# Patient Record
Sex: FEMALE | Race: BLACK OR AFRICAN AMERICAN | NOT HISPANIC OR LATINO | Employment: FULL TIME | URBAN - METROPOLITAN AREA
[De-identification: names, ages, dates, MRNs, and addresses within clinical notes are randomized per-mention and may not be internally consistent; named-entity substitution may affect disease eponyms.]

---

## 2021-02-18 ENCOUNTER — OFFICE VISIT (OUTPATIENT)
Dept: URGENT CARE | Facility: CLINIC | Age: 46
End: 2021-02-18
Payer: MEDICAID

## 2021-02-18 VITALS
OXYGEN SATURATION: 96 % | RESPIRATION RATE: 16 BRPM | HEIGHT: 60 IN | SYSTOLIC BLOOD PRESSURE: 134 MMHG | WEIGHT: 180 LBS | BODY MASS INDEX: 35.34 KG/M2 | TEMPERATURE: 100 F | DIASTOLIC BLOOD PRESSURE: 85 MMHG | HEART RATE: 128 BPM

## 2021-02-18 DIAGNOSIS — M79.601 PAIN IN RIGHT ARM: Primary | ICD-10-CM

## 2021-02-18 PROCEDURE — 73110 X-RAY EXAM OF WRIST: CPT | Mod: FY,RT,S$GLB, | Performed by: RADIOLOGY

## 2021-02-18 PROCEDURE — 73080 XR ELBOW COMPLETE 3 VIEW RIGHT: ICD-10-PCS | Mod: FY,RT,S$GLB, | Performed by: RADIOLOGY

## 2021-02-18 PROCEDURE — 99213 PR OFFICE/OUTPT VISIT, EST, LEVL III, 20-29 MIN: ICD-10-PCS | Mod: S$GLB,,, | Performed by: FAMILY MEDICINE

## 2021-02-18 PROCEDURE — 99213 OFFICE O/P EST LOW 20 MIN: CPT | Mod: S$GLB,,, | Performed by: FAMILY MEDICINE

## 2021-02-18 PROCEDURE — 73080 X-RAY EXAM OF ELBOW: CPT | Mod: FY,RT,S$GLB, | Performed by: RADIOLOGY

## 2021-02-18 PROCEDURE — 73110 XR WRIST COMPLETE 3 VIEWS RIGHT: ICD-10-PCS | Mod: FY,RT,S$GLB, | Performed by: RADIOLOGY

## 2021-02-18 RX ORDER — KETOROLAC TROMETHAMINE 10 MG/1
TABLET, FILM COATED ORAL
Qty: 20 TABLET | Refills: 0 | Status: SHIPPED | OUTPATIENT
Start: 2021-02-18

## 2021-07-29 ENCOUNTER — LAB VISIT (OUTPATIENT)
Dept: PRIMARY CARE CLINIC | Facility: OTHER | Age: 46
End: 2021-07-29
Attending: INTERNAL MEDICINE
Payer: MEDICAID

## 2021-07-29 DIAGNOSIS — Z20.822 ENCOUNTER FOR LABORATORY TESTING FOR COVID-19 VIRUS: ICD-10-CM

## 2021-07-29 PROCEDURE — U0003 INFECTIOUS AGENT DETECTION BY NUCLEIC ACID (DNA OR RNA); SEVERE ACUTE RESPIRATORY SYNDROME CORONAVIRUS 2 (SARS-COV-2) (CORONAVIRUS DISEASE [COVID-19]), AMPLIFIED PROBE TECHNIQUE, MAKING USE OF HIGH THROUGHPUT TECHNOLOGIES AS DESCRIBED BY CMS-2020-01-R: HCPCS | Performed by: INTERNAL MEDICINE

## 2021-07-31 LAB
SARS-COV-2 RNA RESP QL NAA+PROBE: NOT DETECTED
SARS-COV-2- CYCLE NUMBER: -1

## 2024-10-22 ENCOUNTER — OCCUPATIONAL HEALTH (OUTPATIENT)
Dept: URGENT CARE | Facility: CLINIC | Age: 49
End: 2024-10-22

## 2024-10-22 DIAGNOSIS — Z13.9 ENCOUNTER FOR SCREENING: Primary | ICD-10-CM

## 2024-10-24 LAB
TB INDURATION 48 - 72 HR READ: 0 MM
TB SKIN TEST 48 - 72 HR READ: NEGATIVE

## 2024-12-13 ENCOUNTER — HOSPITAL ENCOUNTER (EMERGENCY)
Facility: HOSPITAL | Age: 49
Discharge: HOME | End: 2024-12-13
Attending: EMERGENCY MEDICINE | Admitting: INTERNAL MEDICINE
Payer: COMMERCIAL

## 2024-12-13 VITALS
BODY MASS INDEX: 37.3 KG/M2 | RESPIRATION RATE: 16 BRPM | OXYGEN SATURATION: 100 % | WEIGHT: 190 LBS | DIASTOLIC BLOOD PRESSURE: 65 MMHG | HEIGHT: 60 IN | TEMPERATURE: 98 F | HEART RATE: 81 BPM | SYSTOLIC BLOOD PRESSURE: 148 MMHG

## 2024-12-13 DIAGNOSIS — D64.9 ANEMIA: ICD-10-CM

## 2024-12-13 PROBLEM — R03.0 ELEVATED BP WITHOUT DIAGNOSIS OF HYPERTENSION: Status: ACTIVE | Noted: 2024-12-13

## 2024-12-13 LAB
ABO + RH BLD: NORMAL
ANION GAP SERPL CALC-SCNC: 11 MMOL/L (ref 8–16)
ANISOCYTOSIS BLD QL SMEAR: SLIGHT
B-HCG UR QL: NEGATIVE
BASOPHILS # BLD AUTO: 0.03 K/UL (ref 0–0.2)
BASOPHILS NFR BLD: 0.5 % (ref 0–1.9)
BILIRUB DIRECT SERPL-MCNC: 0.1 MG/DL (ref 0.1–0.3)
BILIRUB SERPL-MCNC: 0.3 MG/DL (ref 0.1–1)
BLD GP AB SCN CELLS X3 SERPL QL: NORMAL
BLD PROD TYP BPU: NORMAL
BLOOD UNIT EXPIRATION DATE: NORMAL
BLOOD UNIT TYPE CODE: 7300
BLOOD UNIT TYPE: NORMAL
BUN SERPL-MCNC: 12 MG/DL (ref 6–20)
CALCIUM SERPL-MCNC: 10.6 MG/DL (ref 8.7–10.5)
CHLORIDE SERPL-SCNC: 105 MMOL/L (ref 95–110)
CO2 SERPL-SCNC: 22 MMOL/L (ref 23–29)
CODING SYSTEM: NORMAL
CREAT SERPL-MCNC: 1 MG/DL (ref 0.5–1.4)
CROSSMATCH INTERPRETATION: NORMAL
CTP QC/QA: YES
DIFFERENTIAL METHOD BLD: ABNORMAL
DISPENSE STATUS: NORMAL
EOSINOPHIL # BLD AUTO: 0.1 K/UL (ref 0–0.5)
EOSINOPHIL NFR BLD: 1.7 % (ref 0–8)
ERYTHROCYTE [DISTWIDTH] IN BLOOD BY AUTOMATED COUNT: 23.2 % (ref 11.5–14.5)
EST. GFR  (NO RACE VARIABLE): >60 ML/MIN/1.73 M^2
FERRITIN SERPL-MCNC: 5 NG/ML (ref 20–300)
GLUCOSE SERPL-MCNC: 94 MG/DL (ref 70–110)
HCT VFR BLD AUTO: 25.7 % (ref 37–48.5)
HGB BLD-MCNC: 6.3 G/DL (ref 12–16)
HYPOCHROMIA BLD QL SMEAR: ABNORMAL
IMM GRANULOCYTES # BLD AUTO: 0.02 K/UL (ref 0–0.04)
IMM GRANULOCYTES NFR BLD AUTO: 0.3 % (ref 0–0.5)
LYMPHOCYTES # BLD AUTO: 1.9 K/UL (ref 1–4.8)
LYMPHOCYTES NFR BLD: 32.5 % (ref 18–48)
MCH RBC QN AUTO: 14.8 PG (ref 27–31)
MCHC RBC AUTO-ENTMCNC: 24.5 G/DL (ref 32–36)
MCV RBC AUTO: 61 FL (ref 82–98)
MONOCYTES # BLD AUTO: 0.4 K/UL (ref 0.3–1)
MONOCYTES NFR BLD: 6.7 % (ref 4–15)
NEUTROPHILS # BLD AUTO: 3.4 K/UL (ref 1.8–7.7)
NEUTROPHILS NFR BLD: 58.3 % (ref 38–73)
NRBC BLD-RTO: 0 /100 WBC
NUM UNITS TRANS PACKED RBC: NORMAL
OVALOCYTES BLD QL SMEAR: ABNORMAL
PLATELET # BLD AUTO: 241 K/UL (ref 150–450)
PLATELET BLD QL SMEAR: ABNORMAL
PMV BLD AUTO: ABNORMAL FL (ref 9.2–12.9)
POIKILOCYTOSIS BLD QL SMEAR: SLIGHT
POLYCHROMASIA BLD QL SMEAR: ABNORMAL
POTASSIUM SERPL-SCNC: 4.5 MMOL/L (ref 3.5–5.1)
RBC # BLD AUTO: 4.25 M/UL (ref 4–5.4)
RETICS/RBC NFR AUTO: 1.9 % (ref 0.5–2.5)
SODIUM SERPL-SCNC: 138 MMOL/L (ref 136–145)
SPECIMEN OUTDATE: NORMAL
WBC # BLD AUTO: 5.79 K/UL (ref 3.9–12.7)

## 2024-12-13 PROCEDURE — 86901 BLOOD TYPING SEROLOGIC RH(D): CPT | Performed by: EMERGENCY MEDICINE

## 2024-12-13 PROCEDURE — 85025 COMPLETE CBC W/AUTO DIFF WBC: CPT | Performed by: EMERGENCY MEDICINE

## 2024-12-13 PROCEDURE — 82746 ASSAY OF FOLIC ACID SERUM: CPT

## 2024-12-13 PROCEDURE — 83020 HEMOGLOBIN ELECTROPHORESIS: CPT

## 2024-12-13 PROCEDURE — 36430 TRANSFUSION BLD/BLD COMPNT: CPT

## 2024-12-13 PROCEDURE — 99285 EMERGENCY DEPT VISIT HI MDM: CPT | Mod: 25

## 2024-12-13 PROCEDURE — 85045 AUTOMATED RETICULOCYTE COUNT: CPT

## 2024-12-13 PROCEDURE — P9016 RBC LEUKOCYTES REDUCED: HCPCS | Performed by: EMERGENCY MEDICINE

## 2024-12-13 PROCEDURE — 82247 BILIRUBIN TOTAL: CPT

## 2024-12-13 PROCEDURE — 83010 ASSAY OF HAPTOGLOBIN QUANT: CPT

## 2024-12-13 PROCEDURE — 82248 BILIRUBIN DIRECT: CPT

## 2024-12-13 PROCEDURE — 84466 ASSAY OF TRANSFERRIN: CPT

## 2024-12-13 PROCEDURE — 82607 VITAMIN B-12: CPT

## 2024-12-13 PROCEDURE — 80048 BASIC METABOLIC PNL TOTAL CA: CPT | Performed by: EMERGENCY MEDICINE

## 2024-12-13 PROCEDURE — 82728 ASSAY OF FERRITIN: CPT

## 2024-12-13 PROCEDURE — 81025 URINE PREGNANCY TEST: CPT

## 2024-12-13 PROCEDURE — 86920 COMPATIBILITY TEST SPIN: CPT | Performed by: EMERGENCY MEDICINE

## 2024-12-13 RX ORDER — PHENTERMINE HYDROCHLORIDE 37.5 MG/1
18.75 TABLET ORAL 2 TIMES DAILY
COMMUNITY
Start: 2024-06-20

## 2024-12-13 RX ORDER — FERROUS SULFATE 325(65) MG
325 TABLET ORAL
Qty: 30 TABLET | Refills: 0 | Status: SHIPPED | OUTPATIENT
Start: 2024-12-13 | End: 2024-12-13 | Stop reason: HOSPADM

## 2024-12-13 RX ORDER — HYDROCODONE BITARTRATE AND ACETAMINOPHEN 500; 5 MG/1; MG/1
TABLET ORAL
Status: DISCONTINUED | OUTPATIENT
Start: 2024-12-13 | End: 2024-12-14 | Stop reason: HOSPADM

## 2024-12-13 RX ORDER — FERROUS SULFATE 325(65) MG
325 TABLET ORAL
Qty: 30 TABLET | Refills: 0 | Status: SHIPPED | OUTPATIENT
Start: 2024-12-13 | End: 2025-01-12

## 2024-12-13 NOTE — ED NOTES
Pt referred to ED via PCP for low H&H during routine blood work. Reports labs done Tuesday and yesterday confirming hemoglobin <7. Pt denies any syncope, SOB, weakness, heavy menstrual periods or blood in stool/urine. Pt AAOx4. Mild tachycardia assessed.  All other VSS and pt in no visible distress. MD at bedside.

## 2024-12-14 LAB
FOLATE SERPL-MCNC: 18.1 NG/ML (ref 4–24)
HAPTOGLOB SERPL-MCNC: 69 MG/DL (ref 30–250)
IRON SERPL-MCNC: 42 UG/DL (ref 30–160)
SATURATED IRON: 7 % (ref 20–50)
TOTAL IRON BINDING CAPACITY: 580 UG/DL (ref 250–450)
TRANSFERRIN SERPL-MCNC: 392 MG/DL (ref 200–375)
VIT B12 SERPL-MCNC: 541 PG/ML (ref 210–950)

## 2024-12-14 NOTE — ED NOTES
Pt informed nurse that she does not want to stay. She wants to get the blood transfusion and go home. Nurse called admit team and informed them. Admit providers states that they would come and talk with the pt.

## 2024-12-14 NOTE — DISCHARGE INSTRUCTIONS
Please start taking ferrous sulfate daily. Prescription sent to your pharmacy.     Heme/onc and gastroenterology teams will call you to make an appointment.     We (Internal Medicine team you met) will call with the results of the labs and fax them to your primary care doctor.     Please return to the ED if you feel weak, dizzy or lightheaded.

## 2024-12-14 NOTE — PHARMACY MED REC
"  [facility]           Pharmacy  Admission Medication History     The home medication history was taken by Joie Kent.      Medication history obtained from Medications listed below were obtained from: Patient/family.Patient states she is not taking any medications    Based on information gathered for medication list, you may go to "Admission" then "Reconcile Home Medications" tabs to review and/or act upon those items.     The home medication list has been updated by the Pharmacy department.   Please read ALL comments highlighted in yellow.   Please address this information as you see fit.    Feel free to contact us if you have any questions or require assistance.        No current facility-administered medications on file prior to encounter.     Current Outpatient Medications on File Prior to Encounter   Medication Sig Dispense Refill       Please address this information as you see fit.  Feel free to contact us if you have any questions or require assistance.    Joie Kent  [phone]                .          "

## 2024-12-14 NOTE — H&P
[facility] Hospital Medicine Consult Note     Admitting Team: [facility] Hospitalist Team A  Attending Physician: Erick Mccarty Jr., MD  Resident: Tung   Intern: Gloria        Chief Complaint     Anemia (Referred to ED by PCP for Low HnH, no hx of anemia or blood transfusion. Denies CP, SOB, abdominal pain, HA, dizziness. No rectal bleeding, no dark tarry stool. Denies any other medical hx. Hgb of 6, blood taken Tuesday and yesterday. )   for 1 day    Subjective:      History of Present Illness:  Lela Reeder is a 49 y.o. female with no known significant past medical history. The patient presented on 12/13/2024 for evaluation of acute blood loss anemia after being called by PCP for abnormal lab. Pt was called to come to ED for blood transfusion after her CBC showed Hb of 6.3 Pt denies any overt bleeding, hemorrhoids, black tarry stools or vaginal bleeding. LMP was 4 months ago, states she has been having hot flashes and thinks she is in menopause. Last PAP was 3 years ago; states normal. Never had mammogram or colonoscopy; was going to start cologuard with PCP. No NSAIDs or goodie freedman OTC. Denies fever, chills, SOB, CP, dyspnea on exertion, dizziness or lightheadedness. Pt has no children, never been diagnosed anemic, no known family members with anemia, never had blood transfusion.     Significant family history: Grandmother with colon ca.     Past Medical History:  History reviewed. No pertinent past medical history.    Past Surgical History:  History reviewed. No pertinent surgical history.    Social History:  Tobacco: vapes occasionally   Alcohol: on weekends 2 drinks/day  Drugs: none    Family History:  Grandmother- colon ca    Home Medications:  None; takes OTC biotin, vitamin D and C.    Allergies:  Review of patient's allergies indicates:  No Known Allergies    Review of Systems:  ROS  As above; all other systems are negative     Health Care Maintenance :   Primary Care Physician: No primary care  "provider on file.     Immunizations:   Immunization History   Administered Date(s) Administered    PPD Test 10/22/2024        Cancer Screening:  PAP: 3 years ago "normal"  MMG: none  Colonoscopy: none    Objective:   Last 24 Hour Vital Signs:  BP  Min: 133/66  Max: 183/78  Temp  Av.1 °F (36.7 °C)  Min: 98.1 °F (36.7 °C)  Max: 98.1 °F (36.7 °C)  Pulse  Av  Min: 83  Max: 105  Resp  Av  Min: 18  Max: 18  SpO2  Av %  Min: 100 %  Max: 100 %  Height  Av' (152.4 cm)  Min: 5' (152.4 cm)  Max: 5' (152.4 cm)  Weight  Av.2 kg (190 lb)  Min: 86.2 kg (190 lb)  Max: 86.2 kg (190 lb)  Body mass index is 37.11 kg/m².  No intake/output data recorded.    Physical Examination:  Physical Exam  Vitals and nursing note reviewed.   Constitutional:       General: She is not in acute distress.     Appearance: She is not ill-appearing, toxic-appearing or diaphoretic.   Eyes:      Comments: Conjunctival pallor    Cardiovascular:      Rate and Rhythm: Normal rate and regular rhythm.      Chest Wall: PMI is not displaced.      Pulses:           Radial pulses are 2+ on the right side and 2+ on the left side.        Dorsalis pedis pulses are 2+ on the right side and 2+ on the left side.      Heart sounds: Heart sounds not distant. No murmur heard.     No friction rub. No gallop.   Pulmonary:      Effort: Pulmonary effort is normal. No respiratory distress.      Breath sounds: Normal breath sounds. No decreased breath sounds, wheezing or rhonchi.   Abdominal:      Palpations: Abdomen is soft.      Tenderness: There is no abdominal tenderness.      Comments: No organomegaly palpable     Musculoskeletal:      Cervical back: Full passive range of motion without pain and normal range of motion.      Right lower leg: No edema.      Left lower leg: No edema.   Neurological:      Mental Status: She is alert.          Laboratory:  Most Recent Data:  CBC:   Lab Results   Component Value Date    WBC 5.79 2024    HGB 6.3 " "(L) 12/13/2024    HCT 25.7 (L) 12/13/2024     12/13/2024    MCV 61 (L) 12/13/2024    RDW 23.2 (H) 12/13/2024       BMP:   Lab Results   Component Value Date     12/13/2024    K 4.5 12/13/2024     12/13/2024    CO2 22 (L) 12/13/2024    BUN 12 12/13/2024    CREATININE 1.0 12/13/2024    GLU 94 12/13/2024    CALCIUM 10.6 (H) 12/13/2024     LFTs: No results found for: "PROT", "ALBUMIN", "BILITOT", "AST", "ALKPHOS", "ALT", "GGT"  Coags: No results found for: "INR", "PROTIME", "PTT"  FLP: No results found for: "CHOL", "HDL", "LDLCALC", "TRIG", "CHOLHDL"  DM:   Lab Results   Component Value Date    CREATININE 1.0 12/13/2024     Thyroid: No results found for: "TSH", "FREET4", "A2AMOVX", "F8ZUQRS", "THYROIDAB"  Anemia: No results found for: "IRON", "TIBC", "FERRITIN", "[id number]", "FOLATE"  Cardiac: No results found for: "TROPONINI", "CKTOTAL", "CKMB", "BNP"  Urinalysis: No results found for: "LABURIN", "COLORU", "PHUA", "CLARITYU", "SPECGRAV", "LABSPEC", "NITRITE", "PROTEINUR", "GLUCOSEU", "KETONESU", "UROBILINOGEN", "BILIRUBINUR", "BLOODU", "RBCU", "WBCUA"    Microbiology Data:   none    Radiology:  Chest XR: none    Other Results:  EKG (my interpretation): none    Assessment:     Lela Reeder is a 49 y.o. female with no significant medical history who presented to ED after PCP called for abnormal hemoglobin level of 6.3 stating she needed a transfusion. Pt asymptomatic and no overt signs of bleeding. [facility] IM consulted in setting of newly diagnosed anemia.     Plan:     #Anemia, microcytic   H/H 6.3/25.7 MCV 61. VSS, pt in no acute distress  No overt bleeding; never had colonoscopy. Likely iron deficiency anemia.  -ED plan to transfuse 1 unit  -iron studies, ferritin, LDH, reticulocytes ordered  -urine pregnancy  ordered   -GI f/up provided  -heme onc f/up provided  -admission was offered to patient but she declined stating she would rather go home  Strict return precautions given.     #elevated " BP  -likely needs log at home and to follow up with PCP  -discuss DASH diet and encourage exercise     #smoking cessation  -still vaping occasionally      Disposition: ED team recommended admission obs and pt declined, stating she wants to go home.      Marie Castro, DO  [facility] PGY-1    [facility] Medicine Hospitalist Pager numbers:   [facility] Hospitalist Medicine Team A (Jennifer/Anna): [phone]  [facility] Hospitalist Medicine Team B (Alexandre/Saleem):  [phone]

## 2024-12-14 NOTE — CONSULTS
[facility]ospitals Hospital Medicine Consult Note      Admitting Team: hospitals Hospitalist Team A  Attending Physician: Erick Mccarty Jr., MD  Resident: Tung   Intern: Gloria           Chief Complaint      Anemia (Referred to ED by PCP for Low HnH, no hx of anemia or blood transfusion. Denies CP, SOB, abdominal pain, HA, dizziness. No rectal bleeding, no dark tarry stool. Denies any other medical hx. Hgb of 6, blood taken Tuesday and yesterday. )   for 1 day     Subjective:      History of Present Illness:  Lela Reeder is a 49 y.o. female with no known significant past medical history. The patient presented on 12/13/2024 for evaluation of acute blood loss anemia after being called by PCP for abnormal lab. Pt was called to come to ED for blood transfusion after her CBC showed Hb of 6.3 Pt denies any overt bleeding, hemorrhoids, black tarry stools or vaginal bleeding. LMP was 4 months ago, states she has been having hot flashes and thinks she is in menopause. Last PAP was 3 years ago; states normal. Never had mammogram or colonoscopy; was going to start cologuard with PCP. No NSAIDs or goodie freedman OTC. Denies fever, chills, SOB, CP, dyspnea on exertion, dizziness or lightheadedness. Pt has no children, never been diagnosed anemic, no known family members with anemia, never had blood transfusion.      Significant family history: Grandmother with colon ca.      Past Medical History:  History reviewed. No pertinent past medical history.     Past Surgical History:  History reviewed. No pertinent surgical history.     Social History:  Tobacco: vapes occasionally   Alcohol: on weekends 2 drinks/day  Drugs: none     Family History:  Grandmother- colon ca     Home Medications:  None; takes OTC biotin, vitamin D and C.     Allergies:  Review of patient's allergies indicates:  No Known Allergies     Review of Systems:  ROS  As above; all other systems are negative      Health Care Maintenance :   Primary Care Physician: No  "primary care provider on file.      Immunizations:        Immunization History   Administered Date(s) Administered    PPD Test 10/22/2024         Cancer Screening:  PAP: 3 years ago "normal"  MMG: none  Colonoscopy: none     Objective:   Last 24 Hour Vital Signs:  BP  Min: 133/66  Max: 183/78  Temp  Av.1 °F (36.7 °C)  Min: 98.1 °F (36.7 °C)  Max: 98.1 °F (36.7 °C)  Pulse  Av  Min: 83  Max: 105  Resp  Av  Min: 18  Max: 18  SpO2  Av %  Min: 100 %  Max: 100 %  Height  Av' (152.4 cm)  Min: 5' (152.4 cm)  Max: 5' (152.4 cm)  Weight  Av.2 kg (190 lb)  Min: 86.2 kg (190 lb)  Max: 86.2 kg (190 lb)  Body mass index is 37.11 kg/m².  No intake/output data recorded.     Physical Examination:  Physical Exam  Vitals and nursing note reviewed.   Constitutional:       General: She is not in acute distress.     Appearance: She is not ill-appearing, toxic-appearing or diaphoretic.   Eyes:      Comments: Conjunctival pallor    Cardiovascular:      Rate and Rhythm: Normal rate and regular rhythm.      Chest Wall: PMI is not displaced.      Pulses:           Radial pulses are 2+ on the right side and 2+ on the left side.        Dorsalis pedis pulses are 2+ on the right side and 2+ on the left side.      Heart sounds: Heart sounds not distant. No murmur heard.     No friction rub. No gallop.   Pulmonary:      Effort: Pulmonary effort is normal. No respiratory distress.      Breath sounds: Normal breath sounds. No decreased breath sounds, wheezing or rhonchi.   Abdominal:      Palpations: Abdomen is soft.      Tenderness: There is no abdominal tenderness.      Comments: No organomegaly palpable     Musculoskeletal:      Cervical back: Full passive range of motion without pain and normal range of motion.      Right lower leg: No edema.      Left lower leg: No edema.   Neurological:      Mental Status: She is alert.            Laboratory:  Most Recent Data:  CBC:         Lab Results   Component Value Date     " "WBC 5.79 12/13/2024     HGB 6.3 (L) 12/13/2024     HCT 25.7 (L) 12/13/2024      12/13/2024     MCV 61 (L) 12/13/2024     RDW 23.2 (H) 12/13/2024         BMP:         Lab Results   Component Value Date      12/13/2024     K 4.5 12/13/2024      12/13/2024     CO2 22 (L) 12/13/2024     BUN 12 12/13/2024     CREATININE 1.0 12/13/2024     GLU 94 12/13/2024     CALCIUM 10.6 (H) 12/13/2024      LFTs: No results found for: "PROT", "ALBUMIN", "BILITOT", "AST", "ALKPHOS", "ALT", "GGT"  Coags: No results found for: "INR", "PROTIME", "PTT"  FLP: No results found for: "CHOL", "HDL", "LDLCALC", "TRIG", "CHOLHDL"  DM:         Lab Results   Component Value Date     CREATININE 1.0 12/13/2024      Thyroid: No results found for: "TSH", "FREET4", "S1NRJUO", "F4EGNCI", "THYROIDAB"  Anemia: No results found for: "IRON", "TIBC", "FERRITIN", "VXPINNAK38", "FOLATE"  Cardiac: No results found for: "TROPONINI", "CKTOTAL", "CKMB", "BNP"  Urinalysis: No results found for: "LABURIN", "COLORU", "PHUA", "CLARITYU", "SPECGRAV", "LABSPEC", "NITRITE", "PROTEINUR", "GLUCOSEU", "KETONESU", "UROBILINOGEN", "BILIRUBINUR", "BLOODU", "RBCU", "WBCUA"     Microbiology Data:   none     Radiology:  Chest XR: none     Other Results:  EKG (my interpretation): none     Assessment:      Lela Reeder is a 49 y.o. female with no significant medical history who presented to ED after PCP called for abnormal hemoglobin level of 6.3 stating she needed a transfusion. Pt asymptomatic and no overt signs of bleeding. [facility] IM consulted in setting of newly diagnosed anemia.      Plan:      #Anemia, microcytic   H/H 6.3/25.7 MCV 61. VSS, pt in no acute distress  No overt bleeding; never had colonoscopy. Likely iron deficiency anemia.  -ED plan to transfuse 1 unit  -iron studies, ferritin, LDH, reticulocytes ordered  -urine pregnancy  ordered   -GI f/up provided  -heme onc f/up provided  -admission was offered to patient but she declined stating she " would rather go home  Strict return precautions given.      #elevated BP  -likely needs log at home and to follow up with PCP  -discuss DASH diet and encourage exercise      #smoking cessation  -still vaping occasionally        Disposition: ED team recommended admission obs and pt declined, stating she wants to go home.        Marie Castro, DO  [facility] PGY-1     [facility] Medicine Hospitalist Pager numbers:   [facility] Hospitalist Medicine Team A (Jennifer/Anna):[phone]  [facility] Hospitalist Medicine Team B (Alexandre/Saleem):        [phone]

## 2024-12-14 NOTE — ED PROVIDER NOTES
Encounter Date: 12/13/2024       History     Chief Complaint   Patient presents with    Anemia     Referred to ED by PCP for Low HnH, no hx of anemia or blood transfusion. Denies CP, SOB, abdominal pain, HA, dizziness. No rectal bleeding, no dark tarry stool. Denies any other medical hx. Hgb of 6, blood taken Tuesday and yesterday.      Lela Reeder is a 49 y.o. female who  has no past medical history on file.    The patient presents to the ED due to abnormal lab work.  She had routine blood work drawn and her PCP called to stay that she needed a blood transfusion for hemoglobin of 6.3.  The blood work was several days ago.  At this time she denies any symptoms vaginal bleeding rectal bleeding pain or any other concerns.  She has no known medical problems.    The history is provided by the patient.     Review of patient's allergies indicates:  No Known Allergies  History reviewed. No pertinent past medical history.  History reviewed. No pertinent surgical history.  No family history on file.  Social History     Tobacco Use    Smoking status: Never    Smokeless tobacco: Never     Review of Systems   Constitutional:  Negative for fever.   HENT:  Negative for sore throat.    Respiratory:  Negative for shortness of breath.    Cardiovascular:  Negative for chest pain.   Gastrointestinal:  Negative for nausea.   Genitourinary:  Negative for dysuria.   Musculoskeletal:  Negative for back pain.   Skin:  Negative for rash.   Neurological:  Negative for weakness.   Hematological:  Does not bruise/bleed easily.       Physical Exam     Initial Vitals [12/13/24 1719]   BP Pulse Resp Temp SpO2   (!) 183/78 103 18 98.1 °F (36.7 °C) 100 %      MAP       --         Physical Exam    Constitutional:  Non-toxic appearance. No distress.   HENT:   Head: Normocephalic and atraumatic.   Eyes:   Conjunctival pallor   Cardiovascular:  Regular rhythm, S1 normal and S2 normal.           Pulmonary/Chest: Breath sounds normal. No  respiratory distress.   Abdominal: Abdomen is soft. She exhibits no distension. There is no abdominal tenderness.     Neurological: She is alert.   Skin: Skin is warm. No rash noted.   Psychiatric: She has a normal mood and affect.         ED Course   Procedures  Labs Reviewed   CBC W/ AUTO DIFFERENTIAL - Abnormal       Result Value    WBC 5.79      RBC 4.25      Hemoglobin 6.3 (*)     Hematocrit 25.7 (*)     MCV 61 (*)     MCH 14.8 (*)     MCHC 24.5 (*)     RDW 23.2 (*)     Platelets 241      MPV SEE COMMENT      Immature Granulocytes 0.3      Gran # (ANC) 3.4      Immature Grans (Abs) 0.02      Lymph # 1.9      Mono # 0.4      Eos # 0.1      Baso # 0.03      nRBC 0      Gran % 58.3      Lymph % 32.5      Mono % 6.7      Eosinophil % 1.7      Basophil % 0.5      Platelet Estimate Appears normal      Aniso Slight      Poik Slight      Poly Occasional      Hypo Moderate      Ovalocytes Occasional      Differential Method Automated     BASIC METABOLIC PANEL - Abnormal    Sodium 138      Potassium 4.5      Chloride 105      CO2 22 (*)     Glucose 94      BUN 12      Creatinine 1.0      Calcium 10.6 (*)     Anion Gap 11      eGFR >60     FERRITIN - Abnormal    Ferritin 5 (*)    RETICULOCYTES    Retic 1.9     BILIRUBIN, DIRECT    Bilirubin, Direct 0.1     BILIRUBIN, TOTAL   BILIRUBIN, TOTAL    Total Bilirubin 0.3     IRON AND TIBC   HAPTOGLOBIN   VITAMIN B12   FOLATE   HEMOGLOBIN ELECTROPHORESIS,HGB A2 RAJWINDER.   POCT URINE PREGNANCY    POC Preg Test, Ur Negative       Acceptable Yes     TYPE & SCREEN    Group & Rh B POS      Indirect Min NEG      Specimen Outdate 12/16/2024 23:59     PREPARE RBC SOFT    UNIT NUMBER [id number]      Product Code [id number]      DISPENSE STATUS ISSUED      CODING SYSTEM FIEP580      Unit Blood Type Code 7300      Unit Blood Type B POS      Unit Expiration [id number]      CROSSMATCH INTERPRETATION Compatible            Imaging Results    None          Medications   0.9%   NaCl infusion (for blood administration) (has no administration in time range)     Medical Decision Making  Differential diagnosis includes but is not limited to:   GI bleed, vaginal bleed, microcytic, macrocytic, normocytic anemia    Amount and/or Complexity of Data Reviewed  Labs: ordered. Decision-making details documented in ED Course.    Risk  Prescription drug management.  Risk Details: Patient on reassessment is in no apparent distress.  She is comfortable going home.  She was evaluated by [facility] who will follow up on her lab work and has placed referrals for her to follow-up with GI and Heme-Onc.  Strict ED return precautions were discussed for worsening symptoms or new symptoms of concern.      After taking into careful account the historical factors and physical exam findings of the patient's presentation today, in conjunction with the empirical and objective data obtained on ED workup, no acute emergent medical condition has been identified. The patient appears to be low risk for an emergent medical condition and I feel it is safe and appropriate at this time for the patient to be discharged to follow-up as detailed in their discharge instructions for reevaluation and possible continued outpatient workup and management. I have discussed the specifics of the workup with the patient and the patient has verbalized understanding of the details of the workup, the diagnosis, the treatment plan, and the need for outpatient follow-up.  Although the patient has no emergent etiology today this does not preclude the development of an emergent condition so in addition, I have advised the patient that they can return to the ED and/or activate EMS at any time with worsening of their symptoms, change of their symptoms, or with any other medical complaint.  The patient remained comfortable and stable during their visit in the ED.  Discharge and follow-up instructions discussed with the patient who expressed  understanding and willingness to comply with my recommendations.          Critical Care  Total time providing critical care: 30 minutes               ED Course as of 12/14/24 0031   Fri Dec 13, 2024   1841 Basic metabolic panel(!)  Bicarb is mildly decreased, calcium is mildly increased, no significant abnormality [RN]   1905 CBC auto differential(!)  Microcytic anemia.  Will obtain blood consent for transfusion [RN]   1951 Patient evaluated by [facility].  Patient has declined admission/observation for blood transfusion.  The [facility] Medicine team will place referrals for her to follow up with Heme-Onc and GI and follow-up on pending lab work.  The patient will be discharged after she receives her 1 unit of packed red blood cells [RN]      ED Course User Index  [RN] Erick Mccarty Jr., MD                           Clinical Impression:  Final diagnoses:  [D64.9] Anemia          ED Disposition Condition    Discharge Stable          ED Prescriptions       Medication Sig Dispense Start Date End Date Auth. Provider    ferrous sulfate (FEOSOL) 325 mg (65 mg iron) Tab tablet  (Status: Discontinued) Take 1 tablet (325 mg total) by mouth daily with breakfast. 30 tablet 12/13/2024 12/13/2024 Allison Ruby MD    ferrous sulfate (FEOSOL) 325 mg (65 mg iron) Tab tablet Take 1 tablet (325 mg total) by mouth daily with breakfast. 30 tablet 12/13/2024 1/12/2025 Marie Castro MD          Follow-up Information       Follow up With Specialties Details Why Contact Info Additional Information    [facility] Gastroenterology   [address]  [facility] [address] [phone] Please park in [address] C or D and use [address]. Take Medical Office Bldg elevators.  Please check-in for all clinic appointments on the 1st floor, at the desk, in the [facility] lobby before coming up to the clinic for your appointment.    [facility] HEMATOLOGY/ONCOLOGY Hematology and Oncology   [address]  [place] [address]  [phone]             Portions of this note were dictated using voice recognition software and may contain dictation related errors in spelling/grammar/syntax not found on text review       Erick Mccarty Jr., MD  12/14/24 0031

## 2024-12-18 LAB
HGB A2 MFR BLD HPLC: 1.6 % (ref 2.2–3.2)
HGB FRACT BLD ELPH-IMP: ABNORMAL
HGB FRACT BLD ELPH-IMP: NORMAL